# Patient Record
Sex: MALE | Race: WHITE | NOT HISPANIC OR LATINO | URBAN - METROPOLITAN AREA
[De-identification: names, ages, dates, MRNs, and addresses within clinical notes are randomized per-mention and may not be internally consistent; named-entity substitution may affect disease eponyms.]

---

## 2017-03-28 ENCOUNTER — EMERGENCY (EMERGENCY)
Facility: HOSPITAL | Age: 62
LOS: 1 days | Discharge: HOME | End: 2017-03-28
Attending: EMERGENCY MEDICINE | Admitting: EMERGENCY MEDICINE
Payer: MEDICARE

## 2017-03-28 VITALS
SYSTOLIC BLOOD PRESSURE: 126 MMHG | TEMPERATURE: 98 F | HEART RATE: 76 BPM | OXYGEN SATURATION: 99 % | RESPIRATION RATE: 18 BRPM | DIASTOLIC BLOOD PRESSURE: 78 MMHG | WEIGHT: 154.98 LBS | HEIGHT: 67 IN

## 2017-03-28 DIAGNOSIS — Z88.1 ALLERGY STATUS TO OTHER ANTIBIOTIC AGENTS STATUS: ICD-10-CM

## 2017-03-28 DIAGNOSIS — Z88.0 ALLERGY STATUS TO PENICILLIN: ICD-10-CM

## 2017-03-28 DIAGNOSIS — Z90.89 ACQUIRED ABSENCE OF OTHER ORGANS: Chronic | ICD-10-CM

## 2017-03-28 DIAGNOSIS — B20 HUMAN IMMUNODEFICIENCY VIRUS [HIV] DISEASE: ICD-10-CM

## 2017-03-28 DIAGNOSIS — S70.01XA CONTUSION OF RIGHT HIP, INITIAL ENCOUNTER: ICD-10-CM

## 2017-03-28 DIAGNOSIS — M25.551 PAIN IN RIGHT HIP: ICD-10-CM

## 2017-03-28 DIAGNOSIS — M79.604 PAIN IN RIGHT LEG: ICD-10-CM

## 2017-03-28 PROCEDURE — 73502 X-RAY EXAM HIP UNI 2-3 VIEWS: CPT | Mod: 26,RT

## 2017-03-28 PROCEDURE — 99283 EMERGENCY DEPT VISIT LOW MDM: CPT | Mod: 25

## 2017-03-28 PROCEDURE — 73502 X-RAY EXAM HIP UNI 2-3 VIEWS: CPT | Mod: 26

## 2017-03-28 RX ORDER — ACETAMINOPHEN 500 MG
650 TABLET ORAL ONCE
Qty: 0 | Refills: 0 | Status: COMPLETED | OUTPATIENT
Start: 2017-03-28 | End: 2017-03-28

## 2017-03-28 RX ORDER — IBUPROFEN 200 MG
400 TABLET ORAL ONCE
Qty: 0 | Refills: 0 | Status: COMPLETED | OUTPATIENT
Start: 2017-03-28 | End: 2017-03-28

## 2017-03-28 RX ADMIN — Medication 400 MILLIGRAM(S): at 12:06

## 2017-03-28 RX ADMIN — Medication 650 MILLIGRAM(S): at 12:06

## 2017-03-28 NOTE — ED ADULT TRIAGE NOTE - CHIEF COMPLAINT QUOTE
Patient to ED with complaint of Right leg pain s/p fall 1 day PTA.  Patient partial weight bearing.  No acute distress

## 2017-03-28 NOTE — ED PROVIDER NOTE - TIMING
improving/sudden onset sudden onset/occasional/improving sudden onset/improving improving/occasional/sudden onset

## 2017-03-28 NOTE — ED PROVIDER NOTE - PMH
GERD (gastroesophageal reflux disease) GERD (gastroesophageal reflux disease)    HIV (human immunodeficiency virus infection)

## 2017-03-28 NOTE — ED PROVIDER NOTE - MEDICAL DECISION MAKING DETAILS
Chief complaint: right lower leg pain s/p fall. Differential Dx: hip/ leg contusion v hip/ pelvic fracture. Plan: hip/ pelvic x-ray, give Tylenol and Motrin for pain, and DC with cane.

## 2017-03-28 NOTE — ED PROVIDER NOTE - NS ED MD SCRIBE ATTENDING SCRIBE SECTIONS
INTAKE ASSESSMENT/SCREENINGS/HIV/PAST MEDICAL/SURGICAL/SOCIAL HISTORY/HISTORY OF PRESENT ILLNESS/VITAL SIGNS( Pullset)/REVIEW OF SYSTEMS/PHYSICAL EXAM

## 2017-03-28 NOTE — ED PROVIDER NOTE - MUSCULOSKELETAL, MLM
Discomfort with ROM of right hip. No bruising. No midline tenderness of the sacrum or lumbar regions. Full ROM to left hip, bilateral knees and ankles. Discomfort with ROM of right hip. No bruising. No midline tenderness of the sacrum or lumbar regions. Full ROM to left hip, bilateral knees and ankles.  Abrasion right elbow

## 2017-03-28 NOTE — ED PROVIDER NOTE - OBJECTIVE STATEMENT
61y M Pt with PMHx of HIV (cd4 180, viral load undetectable) and GERD, PCN and augmentin allergies, presents to ED with right posterior upper leg pain s/p trip and fall yesterday morning around 3am. Pt reports he was walking when he tripped on a step and landed on his right leg/ right buttock and elbow. Denies head trauma. Pt was able to walk after fall but reports difficulty walking yesterday after waking up and today. Has been walking slowly with assistance. Pain has improved since yesterday. Associated mild bruising to leg and swelling to right elbow. Ibuprofen and ice improve pain. Denies h/o hip fracture. Denies swelling and sensory deficits. 61y M Pt with PMHx of HIV (cd4 180, viral load undetectable) and GERD, PCN and Augmentin allergies, presents to ED with right posterior upper leg pain s/p trip and fall yesterday morning around 3am. Pt reports he was walking when he tripped on a step and landed on his right leg/ right buttock and elbow. Denies head trauma. Pt was able to walk after fall but reports difficulty walking yesterday after waking up and today. Has been walking slowly with assistance. Pain has improved since yesterday. Associated mild bruising to leg and swelling to right elbow. Ibuprofen and ice improve pain. Denies h/o hip fracture. Denies swelling and sensory deficits. 61y M Pt with PMHx of HIV (cd4 180, viral load undetectable) and GERD, PCN and Augmentin allergies, presents to ED with right posterior upper leg pain s/p trip and fall yesterday morning around 3am. Pt reports he was walking when he tripped on a step and landed on his right leg/ right buttock and elbow. Denies head trauma. Pt was able to walk after fall but reports difficulty walking yesterday after waking up and today. Has been walking slowly with assistance. Pain has improved since yesterday. Associated mild bruising to leg and swelling to right elbow. Ibuprofen and ice improve pain. Denies h/o hip fracture. Denies swelling and sensory deficits. PMD: Mark Ospina. 61y M Pt with PMHx of HIV (cd4 180, viral load undetectable) and GERD, presents to ED with right posterior upper leg pain s/p trip and fall yesterday morning around 3am. Pt reports he was walking when he tripped on a step and landed on his right leg/ right buttock and elbow. Denies head trauma. Pt was able to walk after fall but reports difficulty walking yesterday after waking up and today. Has been walking slowly with assistance. Pain has improved since yesterday. Associated mild bruising to leg and swelling to right elbow. Ibuprofen and ice improve pain. Denies h/o hip fracture. Denies swelling and sensory deficits. PMD: Mark Ospina.

## 2017-06-03 ENCOUNTER — INPATIENT (INPATIENT)
Facility: HOSPITAL | Age: 62
LOS: 0 days | Discharge: HOME | DRG: 395 | End: 2017-06-04
Attending: INTERNAL MEDICINE | Admitting: INTERNAL MEDICINE
Payer: COMMERCIAL

## 2017-06-03 VITALS
RESPIRATION RATE: 14 BRPM | TEMPERATURE: 99 F | OXYGEN SATURATION: 100 % | HEART RATE: 66 BPM | DIASTOLIC BLOOD PRESSURE: 81 MMHG | SYSTOLIC BLOOD PRESSURE: 121 MMHG

## 2017-06-03 DIAGNOSIS — Z90.89 ACQUIRED ABSENCE OF OTHER ORGANS: Chronic | ICD-10-CM

## 2017-06-03 LAB
ALBUMIN SERPL ELPH-MCNC: 4.4 G/DL — SIGNIFICANT CHANGE UP (ref 3.4–5)
ALP SERPL-CCNC: 64 U/L — SIGNIFICANT CHANGE UP (ref 40–120)
ALT FLD-CCNC: 31 U/L — SIGNIFICANT CHANGE UP (ref 12–42)
ANION GAP SERPL CALC-SCNC: 11 MMOL/L — SIGNIFICANT CHANGE UP (ref 9–16)
APTT BLD: 30.3 SEC — SIGNIFICANT CHANGE UP (ref 27.5–36.5)
AST SERPL-CCNC: 34 U/L — SIGNIFICANT CHANGE UP (ref 15–37)
BASOPHILS NFR BLD AUTO: 0.9 % — SIGNIFICANT CHANGE UP (ref 0–2)
BILIRUB SERPL-MCNC: 0.3 MG/DL — SIGNIFICANT CHANGE UP (ref 0.2–1.2)
BUN SERPL-MCNC: 27 MG/DL — HIGH (ref 7–23)
CALCIUM SERPL-MCNC: 9.5 MG/DL — SIGNIFICANT CHANGE UP (ref 8.5–10.5)
CHLORIDE SERPL-SCNC: 100 MMOL/L — SIGNIFICANT CHANGE UP (ref 96–108)
CO2 SERPL-SCNC: 26 MMOL/L — SIGNIFICANT CHANGE UP (ref 22–31)
CREAT SERPL-MCNC: 1.38 MG/DL — HIGH (ref 0.5–1.3)
EOSINOPHIL NFR BLD AUTO: 1.4 % — SIGNIFICANT CHANGE UP (ref 0–6)
GLUCOSE SERPL-MCNC: 85 MG/DL — SIGNIFICANT CHANGE UP (ref 70–99)
HCT VFR BLD CALC: 36.5 % — LOW (ref 39–50)
HGB BLD-MCNC: 12.9 G/DL — LOW (ref 13–17)
IMM GRANULOCYTES NFR BLD AUTO: 0.2 % — SIGNIFICANT CHANGE UP (ref 0–1.5)
INR BLD: 1.03 — SIGNIFICANT CHANGE UP (ref 0.88–1.16)
LIDOCAIN IGE QN: 124 U/L — SIGNIFICANT CHANGE UP (ref 73–393)
LYMPHOCYTES # BLD AUTO: 31.4 % — SIGNIFICANT CHANGE UP (ref 13–44)
MCHC RBC-ENTMCNC: 31.9 PG — SIGNIFICANT CHANGE UP (ref 27–34)
MCHC RBC-ENTMCNC: 35.3 G/DL — SIGNIFICANT CHANGE UP (ref 32–36)
MCV RBC AUTO: 90.1 FL — SIGNIFICANT CHANGE UP (ref 80–100)
MONOCYTES NFR BLD AUTO: 7.2 % — SIGNIFICANT CHANGE UP (ref 2–14)
NEUTROPHILS NFR BLD AUTO: 58.9 % — SIGNIFICANT CHANGE UP (ref 43–77)
PLATELET # BLD AUTO: 177 K/UL — SIGNIFICANT CHANGE UP (ref 150–400)
POTASSIUM SERPL-MCNC: 3.8 MMOL/L — SIGNIFICANT CHANGE UP (ref 3.5–5.3)
POTASSIUM SERPL-SCNC: 3.8 MMOL/L — SIGNIFICANT CHANGE UP (ref 3.5–5.3)
PROT SERPL-MCNC: 7.7 G/DL — SIGNIFICANT CHANGE UP (ref 6.4–8.2)
PROTHROM AB SERPL-ACNC: 11.3 SEC — SIGNIFICANT CHANGE UP (ref 9.8–12.7)
RBC # BLD: 4.05 M/UL — LOW (ref 4.2–5.8)
RBC # FLD: 12.1 % — SIGNIFICANT CHANGE UP (ref 10.3–16.9)
SODIUM SERPL-SCNC: 137 MMOL/L — SIGNIFICANT CHANGE UP (ref 132–145)
WBC # BLD: 5.5 K/UL — SIGNIFICANT CHANGE UP (ref 3.8–10.5)
WBC # FLD AUTO: 5.5 K/UL — SIGNIFICANT CHANGE UP (ref 3.8–10.5)

## 2017-06-03 PROCEDURE — 99285 EMERGENCY DEPT VISIT HI MDM: CPT | Mod: 25

## 2017-06-03 PROCEDURE — 93010 ELECTROCARDIOGRAM REPORT: CPT

## 2017-06-03 PROCEDURE — 99222 1ST HOSP IP/OBS MODERATE 55: CPT | Mod: GC

## 2017-06-03 PROCEDURE — 70491 CT SOFT TISSUE NECK W/DYE: CPT | Mod: 26

## 2017-06-03 RX ORDER — ONDANSETRON 8 MG/1
8 TABLET, FILM COATED ORAL ONCE
Qty: 0 | Refills: 0 | Status: COMPLETED | OUTPATIENT
Start: 2017-06-03 | End: 2017-06-03

## 2017-06-03 RX ORDER — GLUCAGON INJECTION, SOLUTION 0.5 MG/.1ML
1 INJECTION, SOLUTION SUBCUTANEOUS ONCE
Qty: 0 | Refills: 0 | Status: COMPLETED | OUTPATIENT
Start: 2017-06-03 | End: 2017-06-03

## 2017-06-03 RX ORDER — NITROGLYCERIN 6.5 MG
0.4 CAPSULE, EXTENDED RELEASE ORAL ONCE
Qty: 0 | Refills: 0 | Status: COMPLETED | OUTPATIENT
Start: 2017-06-03 | End: 2017-06-03

## 2017-06-03 RX ORDER — SODIUM CHLORIDE 9 MG/ML
1000 INJECTION INTRAMUSCULAR; INTRAVENOUS; SUBCUTANEOUS ONCE
Qty: 0 | Refills: 0 | Status: COMPLETED | OUTPATIENT
Start: 2017-06-03 | End: 2017-06-03

## 2017-06-03 RX ADMIN — ONDANSETRON 8 MILLIGRAM(S): 8 TABLET, FILM COATED ORAL at 21:42

## 2017-06-03 RX ADMIN — SODIUM CHLORIDE 1000 MILLILITER(S): 9 INJECTION INTRAMUSCULAR; INTRAVENOUS; SUBCUTANEOUS at 19:29

## 2017-06-03 RX ADMIN — Medication 0.4 MILLIGRAM(S): at 19:29

## 2017-06-03 RX ADMIN — GLUCAGON INJECTION, SOLUTION 1 MILLIGRAM(S): 0.5 INJECTION, SOLUTION SUBCUTANEOUS at 21:42

## 2017-06-03 NOTE — ED PROVIDER NOTE - NS ED MD SCRIBE ATTENDING SCRIBE SECTIONS
HIV/DISPOSITION/PROGRESS NOTE/INTAKE ASSESSMENT/SCREENINGS/VITAL SIGNS( Pullset)/CONSULTATIONS/SHIFT CHANGE/RESULTS/HISTORY OF PRESENT ILLNESS/REVIEW OF SYSTEMS/PAST MEDICAL/SURGICAL/SOCIAL HISTORY/PHYSICAL EXAM

## 2017-06-03 NOTE — ED PROVIDER NOTE - PROGRESS NOTE DETAILS
Attempted Glucagon for esophageal food impaction.  No success - unable to tolerate solids.  Continues to regurgitate foods and saliva.  Will consult GI.  Case discussed with Dr. Knox from GI.  Agrees that the patient requires an endoscopy for impaction removal.  Case discussed with transfer center and pt accepted by Dr. Cowan for admission to [facility] under Dr. Eid.

## 2017-06-03 NOTE — ED PROVIDER NOTE - MEDICAL DECISION MAKING DETAILS
60 yo M with sore throat after swallowing broccoli. Will conduct IV fluid bolus, Nitrostat and labs and reassess. 62 yo M with sore throat after swallowing broccoli. Will conduct IV fluid bolus, Nitrostat and labs and reassess.  No success with NTG.  CT performed to confirm FB.  Attempted glucagon without success.  Pt requires EGD for FB removal.

## 2017-06-03 NOTE — ED PROVIDER NOTE - CONSTITUTIONAL, MLM
normal... Well appearing, well nourished, awake, alert, oriented to person, place, time/situation and in no apparent distress. Pt was able to swallow small sip of water.

## 2017-06-03 NOTE — ED PROVIDER NOTE - OBJECTIVE STATEMENT
60 yo M with Hx of HIV, GERD and endoscopy with recent T-cell count being 118, presents c/o throat pain after swallowing piece of broccoli. Pt states was eating quickly and swallowed broccoli and soon then after felt throat pain and soreness. Pt notes was able to ingest yogurt and soft foods, but not more solid foods such as fish. Denies fever, chills or any other complaints. 62 yo M with Hx of HIV/AIDS and GERD presents c/o throat pain after swallowing piece of broccoli. Pt states was eating quickly around 430pm today and swallowed a piece of raw broccoli without chewing.  Immediately after felt throat pain and soreness with swallowing (indicates area of pain around sternal notch). Pt notes was able to ingest liquids, but not more solid foods such as fish.  Now regurgitating anything he tries to swallow.  Denies fever, chills or any other complaints.

## 2017-06-03 NOTE — ED ADULT NURSE NOTE - OBJECTIVE STATEMENT
s/p eating broccoli at 4p and believes it is stuck in esophagus, initially was able to swallow yogurt and liquids but now can not swallow without spitting it up

## 2017-06-04 VITALS
HEART RATE: 67 BPM | DIASTOLIC BLOOD PRESSURE: 81 MMHG | RESPIRATION RATE: 18 BRPM | SYSTOLIC BLOOD PRESSURE: 129 MMHG | TEMPERATURE: 98 F | OXYGEN SATURATION: 97 %

## 2017-06-04 DIAGNOSIS — K21.9 GASTRO-ESOPHAGEAL REFLUX DISEASE WITHOUT ESOPHAGITIS: ICD-10-CM

## 2017-06-04 DIAGNOSIS — Z21 ASYMPTOMATIC HUMAN IMMUNODEFICIENCY VIRUS [HIV] INFECTION STATUS: ICD-10-CM

## 2017-06-04 DIAGNOSIS — Z29.9 ENCOUNTER FOR PROPHYLACTIC MEASURES, UNSPECIFIED: ICD-10-CM

## 2017-06-04 DIAGNOSIS — R63.8 OTHER SYMPTOMS AND SIGNS CONCERNING FOOD AND FLUID INTAKE: ICD-10-CM

## 2017-06-04 DIAGNOSIS — F32.9 MAJOR DEPRESSIVE DISORDER, SINGLE EPISODE, UNSPECIFIED: ICD-10-CM

## 2017-06-04 DIAGNOSIS — Z98.49 CATARACT EXTRACTION STATUS, UNSPECIFIED EYE: Chronic | ICD-10-CM

## 2017-06-04 DIAGNOSIS — T18.108A UNSPECIFIED FOREIGN BODY IN ESOPHAGUS CAUSING OTHER INJURY, INITIAL ENCOUNTER: ICD-10-CM

## 2017-06-04 DIAGNOSIS — F41.9 ANXIETY DISORDER, UNSPECIFIED: ICD-10-CM

## 2017-06-04 PROCEDURE — 99239 HOSP IP/OBS DSCHRG MGMT >30: CPT

## 2017-06-04 PROCEDURE — 70360 X-RAY EXAM OF NECK: CPT | Mod: 26

## 2017-06-04 RX ORDER — SODIUM CHLORIDE 9 MG/ML
1000 INJECTION INTRAMUSCULAR; INTRAVENOUS; SUBCUTANEOUS
Qty: 0 | Refills: 0 | Status: DISCONTINUED | OUTPATIENT
Start: 2017-06-04 | End: 2017-06-04

## 2017-06-04 RX ORDER — SODIUM CHLORIDE 9 MG/ML
1000 INJECTION, SOLUTION INTRAVENOUS
Qty: 0 | Refills: 0 | Status: DISCONTINUED | OUTPATIENT
Start: 2017-06-04 | End: 2017-06-04

## 2017-06-04 RX ADMIN — SODIUM CHLORIDE 80 MILLILITER(S): 9 INJECTION, SOLUTION INTRAVENOUS at 03:15

## 2017-06-04 NOTE — DISCHARGE NOTE ADULT - CARE PLAN
Principal Discharge DX:	Foreign body in esophagus, initial encounter  Goal:	Follow-up with a gastroenterologist  Secondary Diagnosis:	Anxiety  Instructions for follow-up, activity and diet:	Continue your home medication  Secondary Diagnosis:	Depression  Instructions for follow-up, activity and diet:	Continue your home medication  Secondary Diagnosis:	GERD (gastroesophageal reflux disease)  Instructions for follow-up, activity and diet:	Continue your home medication  Secondary Diagnosis:	HIV (human immunodeficiency virus infection)  Instructions for follow-up, activity and diet:	Continue your home medication Principal Discharge DX:	Foreign body in esophagus, initial encounter  Goal:	Follow-up with your gastroenterologist  Instructions for follow-up, activity and diet:	You had an upper endoscopy and showed that you had a piece of broccoli stuck in your esophagus. Please ensure adequate mastication prior to swallowing. For 1 day after the endoscopy, please adhere to a clear liquid diet. Subsequently, you can start on a soft diet until the pain resolves. If you start having worsening pain, fever, chills, or bloody vomit, please return to the ED immediately for evaluation.  Secondary Diagnosis:	Anxiety  Instructions for follow-up, activity and diet:	Continue your home medication  Secondary Diagnosis:	Depression  Instructions for follow-up, activity and diet:	Continue your home medication  Secondary Diagnosis:	GERD (gastroesophageal reflux disease)  Instructions for follow-up, activity and diet:	Continue your home medication  Secondary Diagnosis:	HIV (human immunodeficiency virus infection)  Instructions for follow-up, activity and diet:	Continue your home medication Principal Discharge DX:	Foreign body in esophagus, initial encounter  Goal:	Follow-up with your gastroenterologist  Instructions for follow-up, activity and diet:	You had an upper endoscopy and showed that you had a piece of broccoli stuck in your esophagus. Please ensure adequate mastication prior to swallowing. For 1 day after the endoscopy, please adhere to a liquid diet. Subsequently, you can start on a soft diet until the pain resolves. If you start having worsening pain, fever, chills, or bloody vomit, please return to the ED immediately for evaluation. PLease follow up with your primary doctor.  Secondary Diagnosis:	Anxiety  Instructions for follow-up, activity and diet:	Continue your home medication  Secondary Diagnosis:	Depression  Instructions for follow-up, activity and diet:	Continue your home medication  Secondary Diagnosis:	GERD (gastroesophageal reflux disease)  Instructions for follow-up, activity and diet:	Continue your home medication  Secondary Diagnosis:	HIV (human immunodeficiency virus infection)  Instructions for follow-up, activity and diet:	Continue your home medication

## 2017-06-04 NOTE — H&P ADULT - HISTORY OF PRESENT ILLNESS
62 YO M h/o HIV/AIDS (CD4 118 VL UD), depression, anxiety, and GERD p/w throat pain after swallowing a piece of raw broccoli. Pt states that at 430 PM he was eating a chopped salad and was eating quickly and swallowed a piece of raw broccoli without chewing. Afterwards, he felt throat pain and soreness, however, he was able to ingest liquids and yogurt. At 630 PM, he was having dinner and noted worsening throat pain and regurgitated everything he subsequently ate. Pt denies prior history of similar symptoms. Last endoscopy was many years ago when he was diagnosed with GERD. Pt states that he has been coughing to clear his throat but denies, fever, chills, hematemesis, hemoptysis, CP, SOB, abdominal pain, nausea, vomiting, melena, hematochezia, dysuria, urinary frequency, hesitancy, joint pain, or rash.     In the ED, VS T 98.7 HR 66-73 -127/77-82 RR 16-18 SPO2 % on RA. Pt received Zofran 8 mg IVP, Glucagon 1 mg IVP, Nitro 0,4 mg SL, and NS 1 L bolus.

## 2017-06-04 NOTE — PATIENT PROFILE ADULT. - HEALTH/HEALTHCARE ANXIETIES, PROFILE
Concerned about having this procedure tonight and want to be able to eat and drink normally tomorrow

## 2017-06-04 NOTE — H&P ADULT - PROBLEM SELECTOR PLAN 1
- CT scan consistent with bubbly fluid in the esophagus at C7/T1, consistent with a likely food impaction given clinical history; no free air visualized to suggest esophageal perforation  - s/p glucagon and nitro SL without improvement, GI aware and plan for endoscopy tonight  - F/u GI post-procedure

## 2017-06-04 NOTE — H&P ADULT - ATTENDING COMMENTS
pt seen and examined s/p food disimpaction by GI; reviewed vs, labs, ekg, ct report  pt in NAD, awake, alert; agree w/ PE findings  a/p:   1. Food impaction s/p removal , appreciated GI recs; follow up xray results prior to discharge; NPO for now, start clears in AM pending xray read. Follow up w/ GI as outpt.   2. AYAKA: slight BUN/ Cr elevation, baseline unknown; pt reports recent labs done on as outpt were WNL; will monitor renal function and lytes; on IVFs s/p procedure   3. resume home medication after discharge pending xray results. pt seen and examined s/p food disimpaction by GI; reviewed vs, labs, ekg, ct report  pt in NAD, awake, alert; agree w/ PE findings  a/p:   1. Food impaction s/p removal , appreciate GI recs; follow up xray results prior to discharge; NPO for now, start clears in AM pending xray read. Follow up w/ GI as outpt.   2. AYAKA: slight BUN/ Cr elevation, baseline unknown; pt reports recent labs done on as outpt were WNL; will monitor renal function and lytes; on IVFs s/p procedure   3. resume home medication after discharge pending xray results.

## 2017-06-04 NOTE — DISCHARGE NOTE ADULT - HOSPITAL COURSE
62 YO M h/o HIV/AIDS (CD4 118 VL UD), depression, anxiety, and GERD p/w throat pain after swallowing a piece of raw broccoli admitted for a food impaction. Pt went to the OR for an upper endoscopy which showed 62 YO M h/o HIV/AIDS (CD4 118 VL UD), depression, anxiety, and GERD p/w throat pain after swallowing a piece of raw broccoli admitted for a food impaction. Pt went to the OR for an upper endoscopy which showed a piece of broccoli lodged in the esophagus, no evidence of esophagitis or Schatzki's ring. Pt had an XR of his neck to r/o microperforation. 60 YO M h/o HIV/AIDS (CD4 118 VL UD), depression, anxiety, and GERD p/w throat pain after swallowing a piece of raw broccoli admitted for a food impaction. Pt went to the OR for an upper endoscopy which showed a piece of broccoli lodged in the esophagus, no evidence of esophagitis or Schatzki's ring. Pt had an XR of his neck to r/o microperforation which was (-). He is OK to discharge with clear liquid diet for 24 hrs and then soft diet.

## 2017-06-04 NOTE — DISCHARGE NOTE ADULT - PATIENT PORTAL LINK FT
“You can access the [facility] Patient Portal, offered by [facility], by registering with the following website: [email or web address]”

## 2017-06-04 NOTE — H&P ADULT - NSHPLABSRESULTS_GEN_ALL_CORE
.  LABS:                         12.9   5.5   )-----------( 177      ( 03 Jun 2017 19:36 )             36.5     06-03    137  |  100  |  27<H>  ----------------------------<  85  3.8   |  26  |  1.38<H>    Ca    9.5      03 Jun 2017 19:36    TPro  7.7  /  Alb  4.4  /  TBili  0.3  /  DBili  x   /  AST  34  /  ALT  31  /  AlkPhos  64  06-03    PT/INR - ( 03 Jun 2017 19:36 )   PT: 11.3 sec;   INR: 1.03          PTT - ( 03 Jun 2017 19:36 )  PTT:30.3 sec    RADIOLOGY, EKG & ADDITIONAL TESTS:   CT NECK SOFT TISSUE: Dilatation of the visualized esophagus with pocket of bubbly fluid seen at C7/T1 level. No evidence of a mucosal mass lesion in the neck or significant cervical lymphadenopathy. No radiopaque foreign body identified. Consider follow-up and further assessment with esophagram.

## 2017-06-04 NOTE — CONSULT NOTE ADULT - ASSESSMENT
60 YO M h/o HIV/AIDS (CD4 118 VL UD), depression, anxiety, and GERD p/w odynophagia/dysphagia    #food impaction - s/p EGD revealing broccoli stem at the UES s/p endoscopic maneuvering to push into stomach there were no rings dital to the lesion, it seems that impaciton was due to poor ability to masticate solid food. Due to the manipulation and location of the food, there was irritaiton of the hypopharyngeal mucosa, however all bleeding spontaneously stopped and no visible tears or defects.  -liquid diet for 24 hours  -afterwards advanced to soft diet, and stay that way until dental work sorted out  -soft tissue neck XR to evaluate possible free air as food was impacted for 6 hours and area of maneuvering was very tight

## 2017-06-04 NOTE — CONSULT NOTE ADULT - SUBJECTIVE AND OBJECTIVE BOX
60 YO M h/o HIV/AIDS (CD4 118 VL UD), depression, anxiety, and GERD p/w odynophagia/dysphagia     pt reports ingesting unchewed broccoli when experienced sudden neck pain. he was intermittently able to hold down food and liquids, alternating with complete regurgitation. he has never had any similar symptoms in the past. no gradual onset.   he had EGD for GERD in the past, otherwise no GI hx. No abd pain, no fevers or chills, no n/v, no blood noted.   he reports poor dentition and plan to have dental work to explain poor mastication of food.     REVIEW OF SYSTEMS:  Constitutional: No fever, weight loss or fatigue  ENMT:  No difficulty hearing, tinnitus, vertigo; No sinus or throat pain  Respiratory: No cough, wheezing, chills or hemoptysis  Cardiovascular: No chest pain, palpitations, dizziness or leg swelling  Gastrointestinal: No abdominal or epigastric pain. No nausea, vomiting or hematemesis; No diarrhea or constipation. No melena or hematochezia.  Skin: No itching, burning, rashes or lesions   Musculoskeletal: No joint pain or swelling; No muscle, back or extremity pain    PAST MEDICAL & SURGICAL HISTORY:  Depression  Anxiety  HIV (human immunodeficiency virus infection)  GERD (gastroesophageal reflux disease)  S/P cataract extraction  S/P tonsillectomy      FAMILY HISTORY:  Paternal family history of emphysema (Father)      SOCIAL HISTORY:  Smoking Status: [ ] Current, [ ] Former, [ ] Never  Pack Years:    MEDICATIONS:  MEDICATIONS  (STANDING):  sodium chloride 0.9%. 1000milliLiter(s) IV Continuous <Continuous>    MEDICATIONS  (PRN):      Allergies    Augmentin (Rash)  penicillin (Rash)    Intolerances        Vital Signs Last 24 Hrs  T(C): 36.6, Max: 37.1 (06-03 @ 19:08)  T(F): 97.8, Max: 98.7 (06-03 @ 19:08)  HR: 69 (66 - 73)  BP: 136/83 (121/81 - 136/83)  BP(mean): --  RR: 18 (14 - 18)  SpO2: 98% (98% - 100%)        PHYSICAL EXAM:    General: Well developed; well nourished; anxious  HEENT: MMM, conjunctiva and sclera clear, tender submandibular area  Gastrointestinal: Soft, non-tender non-distended; Normal bowel sounds; No rebound or guarding  Extremities: Normal range of motion, No clubbing, cyanosis or edema  Neurological: Alert and oriented x3  Skin: Warm and dry. No obvious rash      LABS:                        12.9   5.5   )-----------( 177      ( 03 Jun 2017 19:36 )             36.5   Impression: Dilatation of the visualized esophagus with pocket of bubbly   fluid seen at C7/T1 level. No evidence of a mucosal mass lesion in the   neck or significant cervical lymphadenopathy. No radiopaque foreign body   identified. Consider follow-up and further assessment with esophagram.  06-03    137  |  100  |  27<H>  ----------------------------<  85  3.8   |  26  |  1.38<H>    Ca    9.5      03 Jun 2017 19:36    TPro  7.7  /  Alb  4.4  /  TBili  0.3  /  DBili  x   /  AST  34  /  ALT  31  /  AlkPhos  64  06-03        RADIOLOGY & ADDITIONAL STUDIES:     ression: Dilatation of the visualized esophagus with pocket of bubbly   fluid seen at C7/T1 level. No evidence of a mucosal mass lesion in the   neck or significant cervical lymphadenopathy. No radiopaque foreign body   identified. Consider follow-up and further assessment with esophagram.

## 2017-06-04 NOTE — H&P ADULT - NSHPREVIEWOFSYSTEMS_GEN_ALL_CORE
REVIEW OF SYSTEMS:    CONSTITUTIONAL: No weakness, fevers or chills  EYES/ENT: No visual changes;  No vertigo   NECK: No pain or stiffness  RESPIRATORY: No wheezing, hemoptysis; No shortness of breath  CARDIOVASCULAR: No chest pain or palpitations  GASTROINTESTINAL: No abdominal or epigastric pain. No nausea, vomiting, or hematemesis; No diarrhea or constipation. No melena or hematochezia.  GENITOURINARY: No dysuria, frequency or hematuria  NEUROLOGICAL: No numbness or weakness  SKIN: No itching, burning, rashes, or lesions   All other review of systems is negative unless indicated above.

## 2017-06-04 NOTE — DISCHARGE NOTE ADULT - SECONDARY DIAGNOSIS.
Anxiety Depression GERD (gastroesophageal reflux disease) HIV (human immunodeficiency virus infection)

## 2017-06-04 NOTE — H&P ADULT - ASSESSMENT
62 YO M h/o HIV/AIDS (CD4 118 VL UD), depression, anxiety, and GERD p/w throat pain after swallowing a piece of raw broccoli.

## 2017-06-04 NOTE — DISCHARGE NOTE ADULT - MEDICATION SUMMARY - MEDICATIONS TO TAKE
I will START or STAY ON the medications listed below when I get home from the hospital:    KlonoPIN  --  by mouth   -- Indication: For Anxiety    sertraline  --  by mouth   -- Indication: For Depression    Selzentry  --  by mouth   -- Indication: For HIV (human immunodeficiency virus infection)    Stribild  --  by mouth   -- Indication: For HIV (human immunodeficiency virus infection)    Zithromax  --  by mouth   -- Indication: For HIV (human immunodeficiency virus infection)    RABEprazole  --  by mouth   -- Indication: For GERD (gastroesophageal reflux disease)

## 2017-06-04 NOTE — H&P ADULT - NSHPPHYSICALEXAM_GEN_ALL_CORE
.  VITAL SIGNS:  T(C): 36.6, Max: 37.1 (06-03 @ 19:08)  T(F): 97.8, Max: 98.7 (06-03 @ 19:08)  HR: 69 (66 - 73)  BP: 136/83 (121/81 - 136/83)  BP(mean): --  RR: 18 (14 - 18)  SpO2: 98% (98% - 100%)  Wt(kg): --    PHYSICAL EXAM:    Constitutional: WDWN resting comfortably in bed; NAD  Head: NC/AT  Eyes: PERRL, EOMI, anicteric sclera  ENT: no nasal discharge; uvula midline, no oropharyngeal erythema or exudates; MMM; no stridor  Neck: supple; no JVD or thyromegaly  Respiratory: CTA B/L; no W/R/R, no retractions  Cardiac: +S1/S2; RRR; no M/R/G; PMI non-displaced  Gastrointestinal: soft, NT/ND; no rebound or guarding; +BSx4  Extremities: WWP, no clubbing or cyanosis; no peripheral edema  Musculoskeletal: no joint swelling, tenderness or erythema; gait balanced  Vascular: 2+ radial and DP pulses B/L  Dermatologic: skin warm, dry and intact; no rashes, wounds, or scars  Lymphatic: no submandibular or cervical LAD  Neurologic: AAOx3; CNII-XII grossly intact; no focal deficits  Psychiatric: affect and characteristics of appearance, verbalizations, behaviors are appropriate

## 2017-06-04 NOTE — DISCHARGE NOTE ADULT - PLAN OF CARE
Follow-up with a gastroenterologist Continue your home medication You had an upper endoscopy and showed that you had a piece of broccoli stuck in your esophagus. Please ensure adequate mastication prior to swallowing. For 1 day after the endoscopy, please adhere to a clear liquid diet. Subsequently, you can start on a soft diet until the pain resolves. If you start having worsening pain, fever, chills, or bloody vomit, please return to the ED immediately for evaluation. Follow-up with your gastroenterologist You had an upper endoscopy and showed that you had a piece of broccoli stuck in your esophagus. Please ensure adequate mastication prior to swallowing. For 1 day after the endoscopy, please adhere to a liquid diet. Subsequently, you can start on a soft diet until the pain resolves. If you start having worsening pain, fever, chills, or bloody vomit, please return to the ED immediately for evaluation. PLease follow up with your primary doctor.

## 2017-06-06 DIAGNOSIS — J98.8 OTHER SPECIFIED RESPIRATORY DISORDERS: ICD-10-CM

## 2017-06-06 DIAGNOSIS — F41.9 ANXIETY DISORDER, UNSPECIFIED: ICD-10-CM

## 2017-06-06 DIAGNOSIS — K21.9 GASTRO-ESOPHAGEAL REFLUX DISEASE WITHOUT ESOPHAGITIS: ICD-10-CM

## 2017-06-06 DIAGNOSIS — T18.128A FOOD IN ESOPHAGUS CAUSING OTHER INJURY, INITIAL ENCOUNTER: ICD-10-CM

## 2017-06-06 DIAGNOSIS — Z21 ASYMPTOMATIC HUMAN IMMUNODEFICIENCY VIRUS [HIV] INFECTION STATUS: ICD-10-CM

## 2017-06-06 DIAGNOSIS — F32.9 MAJOR DEPRESSIVE DISORDER, SINGLE EPISODE, UNSPECIFIED: ICD-10-CM

## 2017-06-07 PROCEDURE — 96375 TX/PRO/DX INJ NEW DRUG ADDON: CPT | Mod: XU

## 2017-06-07 PROCEDURE — 36415 COLL VENOUS BLD VENIPUNCTURE: CPT

## 2017-06-07 PROCEDURE — 93005 ELECTROCARDIOGRAM TRACING: CPT

## 2017-06-07 PROCEDURE — 70491 CT SOFT TISSUE NECK W/DYE: CPT

## 2017-06-07 PROCEDURE — 80053 COMPREHEN METABOLIC PANEL: CPT

## 2017-06-07 PROCEDURE — 85610 PROTHROMBIN TIME: CPT

## 2017-06-07 PROCEDURE — 99285 EMERGENCY DEPT VISIT HI MDM: CPT | Mod: 25

## 2017-06-07 PROCEDURE — 96374 THER/PROPH/DIAG INJ IV PUSH: CPT | Mod: XU

## 2017-06-07 PROCEDURE — 85730 THROMBOPLASTIN TIME PARTIAL: CPT

## 2017-06-07 PROCEDURE — 70360 X-RAY EXAM OF NECK: CPT

## 2017-06-07 PROCEDURE — 83690 ASSAY OF LIPASE: CPT

## 2017-06-07 PROCEDURE — 85025 COMPLETE CBC W/AUTO DIFF WBC: CPT

## 2017-10-08 ENCOUNTER — EMERGENCY (EMERGENCY)
Facility: HOSPITAL | Age: 62
LOS: 1 days | Discharge: HOME | End: 2017-10-08
Admitting: EMERGENCY MEDICINE
Payer: MEDICARE

## 2017-10-08 VITALS
RESPIRATION RATE: 18 BRPM | OXYGEN SATURATION: 97 % | DIASTOLIC BLOOD PRESSURE: 80 MMHG | TEMPERATURE: 98 F | SYSTOLIC BLOOD PRESSURE: 115 MMHG | HEART RATE: 80 BPM

## 2017-10-08 DIAGNOSIS — Z79.899 OTHER LONG TERM (CURRENT) DRUG THERAPY: ICD-10-CM

## 2017-10-08 DIAGNOSIS — Z88.1 ALLERGY STATUS TO OTHER ANTIBIOTIC AGENTS STATUS: ICD-10-CM

## 2017-10-08 DIAGNOSIS — Z88.0 ALLERGY STATUS TO PENICILLIN: ICD-10-CM

## 2017-10-08 DIAGNOSIS — J40 BRONCHITIS, NOT SPECIFIED AS ACUTE OR CHRONIC: ICD-10-CM

## 2017-10-08 DIAGNOSIS — M62.830 MUSCLE SPASM OF BACK: ICD-10-CM

## 2017-10-08 DIAGNOSIS — M54.6 PAIN IN THORACIC SPINE: ICD-10-CM

## 2017-10-08 DIAGNOSIS — Z90.09 ACQUIRED ABSENCE OF OTHER PART OF HEAD AND NECK: ICD-10-CM

## 2017-10-08 DIAGNOSIS — Z87.891 PERSONAL HISTORY OF NICOTINE DEPENDENCE: ICD-10-CM

## 2017-10-08 DIAGNOSIS — Z79.2 LONG TERM (CURRENT) USE OF ANTIBIOTICS: ICD-10-CM

## 2017-10-08 DIAGNOSIS — F41.9 ANXIETY DISORDER, UNSPECIFIED: ICD-10-CM

## 2017-10-08 DIAGNOSIS — Z98.49 CATARACT EXTRACTION STATUS, UNSPECIFIED EYE: Chronic | ICD-10-CM

## 2017-10-08 DIAGNOSIS — M25.511 PAIN IN RIGHT SHOULDER: ICD-10-CM

## 2017-10-08 DIAGNOSIS — Z90.89 ACQUIRED ABSENCE OF OTHER ORGANS: Chronic | ICD-10-CM

## 2017-10-08 LAB
ALBUMIN SERPL ELPH-MCNC: 4.2 G/DL — SIGNIFICANT CHANGE UP (ref 3.4–5)
ALP SERPL-CCNC: 70 U/L — SIGNIFICANT CHANGE UP (ref 40–120)
ALT FLD-CCNC: 33 U/L — SIGNIFICANT CHANGE UP (ref 12–42)
ANION GAP SERPL CALC-SCNC: 10 MMOL/L — SIGNIFICANT CHANGE UP (ref 9–16)
AST SERPL-CCNC: 49 U/L — HIGH (ref 15–37)
BILIRUB SERPL-MCNC: 0.5 MG/DL — SIGNIFICANT CHANGE UP (ref 0.2–1.2)
BUN SERPL-MCNC: 23 MG/DL — SIGNIFICANT CHANGE UP (ref 7–23)
CALCIUM SERPL-MCNC: 9.4 MG/DL — SIGNIFICANT CHANGE UP (ref 8.5–10.5)
CHLORIDE SERPL-SCNC: 103 MMOL/L — SIGNIFICANT CHANGE UP (ref 96–108)
CK SERPL-CCNC: 207 U/L — SIGNIFICANT CHANGE UP (ref 39–308)
CO2 SERPL-SCNC: 25 MMOL/L — SIGNIFICANT CHANGE UP (ref 22–31)
CREAT SERPL-MCNC: 1.18 MG/DL — SIGNIFICANT CHANGE UP (ref 0.5–1.3)
D DIMER BLD IA.RAPID-MCNC: <150 NG/ML DDU — SIGNIFICANT CHANGE UP
GLUCOSE SERPL-MCNC: 106 MG/DL — HIGH (ref 70–99)
HCT VFR BLD CALC: 39.9 % — SIGNIFICANT CHANGE UP (ref 39–50)
HGB BLD-MCNC: 14.6 G/DL — SIGNIFICANT CHANGE UP (ref 13–17)
MCHC RBC-ENTMCNC: 32.7 PG — SIGNIFICANT CHANGE UP (ref 27–34)
MCHC RBC-ENTMCNC: 36.6 G/DL — HIGH (ref 32–36)
MCV RBC AUTO: 89.3 FL — SIGNIFICANT CHANGE UP (ref 80–100)
PLATELET # BLD AUTO: 204 K/UL — SIGNIFICANT CHANGE UP (ref 150–400)
POTASSIUM SERPL-MCNC: 4.6 MMOL/L — SIGNIFICANT CHANGE UP (ref 3.5–5.3)
POTASSIUM SERPL-SCNC: 4.6 MMOL/L — SIGNIFICANT CHANGE UP (ref 3.5–5.3)
PROT SERPL-MCNC: 8.2 G/DL — SIGNIFICANT CHANGE UP (ref 6.4–8.2)
RBC # BLD: 4.47 M/UL — SIGNIFICANT CHANGE UP (ref 4.2–5.8)
RBC # FLD: 12.3 % — SIGNIFICANT CHANGE UP (ref 10.3–16.9)
SODIUM SERPL-SCNC: 138 MMOL/L — SIGNIFICANT CHANGE UP (ref 132–145)
TROPONIN I SERPL-MCNC: <0.017 NG/ML — LOW (ref 0.02–0.06)
WBC # BLD: 9.2 K/UL — SIGNIFICANT CHANGE UP (ref 3.8–10.5)
WBC # FLD AUTO: 9.2 K/UL — SIGNIFICANT CHANGE UP (ref 3.8–10.5)

## 2017-10-08 PROCEDURE — 73030 X-RAY EXAM OF SHOULDER: CPT | Mod: 26,RT

## 2017-10-08 PROCEDURE — 99285 EMERGENCY DEPT VISIT HI MDM: CPT | Mod: 25

## 2017-10-08 PROCEDURE — 71020: CPT | Mod: 26

## 2017-10-08 PROCEDURE — 93010 ELECTROCARDIOGRAM REPORT: CPT

## 2017-10-08 PROCEDURE — 93010 ELECTROCARDIOGRAM REPORT: CPT | Mod: 77

## 2017-10-08 RX ORDER — LIDOCAINE 4 G/100G
1 CREAM TOPICAL
Qty: 10 | Refills: 0
Start: 2017-10-08

## 2017-10-08 RX ORDER — IBUPROFEN 200 MG
1 TABLET ORAL
Qty: 20 | Refills: 0
Start: 2017-10-08 | End: 2017-11-07

## 2017-10-08 RX ORDER — KETOROLAC TROMETHAMINE 30 MG/ML
30 SYRINGE (ML) INJECTION ONCE
Qty: 0 | Refills: 0 | Status: DISCONTINUED | OUTPATIENT
Start: 2017-10-08 | End: 2017-10-08

## 2017-10-08 RX ORDER — DIAZEPAM 5 MG
1 TABLET ORAL
Qty: 10 | Refills: 0
Start: 2017-10-08

## 2017-10-08 RX ORDER — IPRATROPIUM/ALBUTEROL SULFATE 18-103MCG
3 AEROSOL WITH ADAPTER (GRAM) INHALATION ONCE
Qty: 0 | Refills: 0 | Status: COMPLETED | OUTPATIENT
Start: 2017-10-08 | End: 2017-10-08

## 2017-10-08 RX ADMIN — Medication 3 MILLILITER(S): at 07:56

## 2017-10-08 RX ADMIN — Medication 30 MILLIGRAM(S): at 07:07

## 2017-10-08 RX ADMIN — Medication 30 MILLIGRAM(S): at 07:56

## 2017-10-08 RX ADMIN — Medication 60 MILLIGRAM(S): at 07:56

## 2017-10-08 NOTE — ED PROVIDER NOTE - DIAGNOSTIC INTERPRETATION
Xray (wet reads) interpreted by JACQUELINE RAJPUT   CXR - Cardiac silhouette, mediastinal and hilar contours wnl, no acute consolidation, infiltrate, effusion, or PTX. No bony abnormalities noted   Xray shoulder - +soft tissue swelling. no acute fx or dislocation, joint space intact, no effusion noted Xray (wet reads) interpreted by JACQUELINE RAJPUT   CXR - Cardiac silhouette, mediastinal and hilar contours wnl, no acute consolidation, infiltrate, effusion, or PTX. +atelectasis, No bony abnormalities noted   Xray shoulder - no soft tissue swelling. no acute fx or dislocation, joint space intact, no effusion noted

## 2017-10-08 NOTE — ED PROVIDER NOTE - CARE PLAN
Principal Discharge DX:	Muscle spasm  Secondary Diagnosis:	Back pain  Secondary Diagnosis:	Bronchitis

## 2017-10-08 NOTE — ED PROVIDER NOTE - PHYSICAL EXAMINATION
Gen - WDWN M, in pain, no respiratory distress, non-toxic appearing, speaking in full sentences   Skin - warm, dry, intact  HEENT - AT/NC, PERRL, EOMI, no conjunctival injection, o/p clear with no erythema, edema, or exudate, uvula midline, airway patent, neck supple, no JVD or carotid bruits b/l, no palpable nodes, FROM  CV - S1S2, R/R/R  Resp - respiration non-labored, mild expiratory wheezing to the bibasilar region, R>L,, no focal rhonchi or rales   GI - NABS, soft, ND, NT, no rebound or guarding, no CVAT b/l   MS - w/w/p, R upper back/subscapular region TTP with no focal edema/ecchymosis/crepitus/fluctuance, NV intact, FROM, tenderness with change in position    Neuro - AxOx3, no focal neuro deficits, CN II-XII grossly intact, cerebellar function intact, ambulatory without gait disturbance

## 2017-10-08 NOTE — ED PROVIDER NOTE - OBJECTIVE STATEMENT
62 yo M with PMHx of HIV/AIDS, last CD4 180s, VL undetectable, on HAART, bactrim for PCP ppx, anxiety, depression, GERD, presenting c/o R upper back and shoulder pain x 2d.  Pt reports sudden onset of pain in the R upper back/shoulder region with sharp sensation radiating to the R arm.  Pain is worse with change in position and with no improvement after taking tylenol and lidocaine patch.  Noted pain radiating to the front of the chest tonight and mild tingling sensation in the R arm.  Denies fever, chills, palpitations, diaphoresis, PRAKASH, SOB, orthopnea, cough, hemoptysis, wheezing, peripheral edema, focal weakness, numbness, paresthesia, HA, dizziness, neck pain, N/V/D/C, abdominal pain, change in urinary/bowel function, trauma, fall, rash, and malaise. Admits to working out and lifting some light weights a few days ago, otherwise no associated injury or recent travel noted

## 2021-06-18 ENCOUNTER — EMERGENCY (EMERGENCY)
Facility: HOSPITAL | Age: 66
LOS: 1 days | Discharge: HOME | End: 2021-06-18
Attending: EMERGENCY MEDICINE | Admitting: EMERGENCY MEDICINE
Payer: MEDICARE

## 2021-06-18 VITALS
RESPIRATION RATE: 16 BRPM | TEMPERATURE: 98 F | OXYGEN SATURATION: 97 % | SYSTOLIC BLOOD PRESSURE: 115 MMHG | HEIGHT: 67.5 IN | WEIGHT: 147.93 LBS | HEART RATE: 78 BPM | DIASTOLIC BLOOD PRESSURE: 53 MMHG

## 2021-06-18 DIAGNOSIS — T50.992A POISONING BY OTHER DRUGS, MEDICAMENTS AND BIOLOGICAL SUBSTANCES, INTENTIONAL SELF-HARM, INITIAL ENCOUNTER: ICD-10-CM

## 2021-06-18 DIAGNOSIS — Z90.89 ACQUIRED ABSENCE OF OTHER ORGANS: Chronic | ICD-10-CM

## 2021-06-18 DIAGNOSIS — T46.6X1A POISONING BY ANTIHYPERLIPIDEMIC AND ANTIARTERIOSCLEROTIC DRUGS, ACCIDENTAL (UNINTENTIONAL), INITIAL ENCOUNTER: ICD-10-CM

## 2021-06-18 DIAGNOSIS — Z87.19 PERSONAL HISTORY OF OTHER DISEASES OF THE DIGESTIVE SYSTEM: ICD-10-CM

## 2021-06-18 DIAGNOSIS — T43.221A POISONING BY SELECTIVE SEROTONIN REUPTAKE INHIBITORS, ACCIDENTAL (UNINTENTIONAL), INITIAL ENCOUNTER: ICD-10-CM

## 2021-06-18 DIAGNOSIS — B20 HUMAN IMMUNODEFICIENCY VIRUS [HIV] DISEASE: ICD-10-CM

## 2021-06-18 DIAGNOSIS — Z88.0 ALLERGY STATUS TO PENICILLIN: ICD-10-CM

## 2021-06-18 DIAGNOSIS — Z98.49 CATARACT EXTRACTION STATUS, UNSPECIFIED EYE: Chronic | ICD-10-CM
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
## 2021-06-18 DIAGNOSIS — Z98.49 CATARACT EXTRACTION STATUS, UNSPECIFIED EYE: ICD-10-CM

## 2021-06-18 DIAGNOSIS — T46.4X1A POISONING BY ANGIOTENSIN-CONVERTING-ENZYME INHIBITORS, ACCIDENTAL (UNINTENTIONAL), INITIAL ENCOUNTER: ICD-10-CM

## 2021-06-18 DIAGNOSIS — R53.1 WEAKNESS: ICD-10-CM

## 2021-06-18 DIAGNOSIS — F32.9 MAJOR DEPRESSIVE DISORDER, SINGLE EPISODE, UNSPECIFIED: ICD-10-CM

## 2021-06-18 DIAGNOSIS — Z87.891 PERSONAL HISTORY OF NICOTINE DEPENDENCE: ICD-10-CM

## 2021-06-18 DIAGNOSIS — F41.9 ANXIETY DISORDER, UNSPECIFIED: ICD-10-CM

## 2021-06-18 DIAGNOSIS — Z90.89 ACQUIRED ABSENCE OF OTHER ORGANS: ICD-10-CM

## 2021-06-18 LAB
ALBUMIN SERPL ELPH-MCNC: 4.1 G/DL — SIGNIFICANT CHANGE UP (ref 3.4–5)
ALP SERPL-CCNC: 60 U/L — SIGNIFICANT CHANGE UP (ref 40–120)
ALT FLD-CCNC: 30 U/L — SIGNIFICANT CHANGE UP (ref 12–42)
ANION GAP SERPL CALC-SCNC: 4 MMOL/L — LOW (ref 9–16)
AST SERPL-CCNC: 31 U/L — SIGNIFICANT CHANGE UP (ref 15–37)
BASOPHILS # BLD AUTO: 0.06 K/UL — SIGNIFICANT CHANGE UP (ref 0–0.2)
BASOPHILS NFR BLD AUTO: 1.1 % — SIGNIFICANT CHANGE UP (ref 0–2)
BILIRUB SERPL-MCNC: 0.3 MG/DL — SIGNIFICANT CHANGE UP (ref 0.2–1.2)
BUN SERPL-MCNC: 31 MG/DL — HIGH (ref 7–23)
CALCIUM SERPL-MCNC: 9.5 MG/DL — SIGNIFICANT CHANGE UP (ref 8.5–10.5)
CHLORIDE SERPL-SCNC: 106 MMOL/L — SIGNIFICANT CHANGE UP (ref 96–108)
CO2 SERPL-SCNC: 31 MMOL/L — SIGNIFICANT CHANGE UP (ref 22–31)
CREAT SERPL-MCNC: 1.21 MG/DL — SIGNIFICANT CHANGE UP (ref 0.5–1.3)
EOSINOPHIL # BLD AUTO: 0.09 K/UL — SIGNIFICANT CHANGE UP (ref 0–0.5)
EOSINOPHIL NFR BLD AUTO: 1.6 % — SIGNIFICANT CHANGE UP (ref 0–6)
GLUCOSE SERPL-MCNC: 97 MG/DL — SIGNIFICANT CHANGE UP (ref 70–99)
HCT VFR BLD CALC: 40.2 % — SIGNIFICANT CHANGE UP (ref 39–50)
HGB BLD-MCNC: 13.9 G/DL — SIGNIFICANT CHANGE UP (ref 13–17)
IMM GRANULOCYTES NFR BLD AUTO: 0.4 % — SIGNIFICANT CHANGE UP (ref 0–1.5)
LIDOCAIN IGE QN: 113 U/L — SIGNIFICANT CHANGE UP (ref 73–393)
LYMPHOCYTES # BLD AUTO: 1.42 K/UL — SIGNIFICANT CHANGE UP (ref 1–3.3)
LYMPHOCYTES # BLD AUTO: 25.2 % — SIGNIFICANT CHANGE UP (ref 13–44)
MCHC RBC-ENTMCNC: 31.2 PG — SIGNIFICANT CHANGE UP (ref 27–34)
MCHC RBC-ENTMCNC: 34.6 GM/DL — SIGNIFICANT CHANGE UP (ref 32–36)
MCV RBC AUTO: 90.3 FL — SIGNIFICANT CHANGE UP (ref 80–100)
MONOCYTES # BLD AUTO: 0.44 K/UL — SIGNIFICANT CHANGE UP (ref 0–0.9)
MONOCYTES NFR BLD AUTO: 7.8 % — SIGNIFICANT CHANGE UP (ref 2–14)
NEUTROPHILS # BLD AUTO: 3.61 K/UL — SIGNIFICANT CHANGE UP (ref 1.8–7.4)
NEUTROPHILS NFR BLD AUTO: 63.9 % — SIGNIFICANT CHANGE UP (ref 43–77)
NRBC # BLD: 0 /100 WBCS — SIGNIFICANT CHANGE UP (ref 0–0)
PLATELET # BLD AUTO: 159 K/UL — SIGNIFICANT CHANGE UP (ref 150–400)
POTASSIUM SERPL-MCNC: 4.2 MMOL/L — SIGNIFICANT CHANGE UP (ref 3.5–5.3)
POTASSIUM SERPL-SCNC: 4.2 MMOL/L — SIGNIFICANT CHANGE UP (ref 3.5–5.3)
PROT SERPL-MCNC: 7.3 G/DL — SIGNIFICANT CHANGE UP (ref 6.4–8.2)
RBC # BLD: 4.45 M/UL — SIGNIFICANT CHANGE UP (ref 4.2–5.8)
RBC # FLD: 12.7 % — SIGNIFICANT CHANGE UP (ref 10.3–14.5)
SODIUM SERPL-SCNC: 141 MMOL/L — SIGNIFICANT CHANGE UP (ref 132–145)
WBC # BLD: 5.64 K/UL — SIGNIFICANT CHANGE UP (ref 3.8–10.5)
WBC # FLD AUTO: 5.64 K/UL — SIGNIFICANT CHANGE UP (ref 3.8–10.5)

## 2021-06-18 PROCEDURE — 99284 EMERGENCY DEPT VISIT MOD MDM: CPT

## 2021-06-18 PROCEDURE — 93010 ELECTROCARDIOGRAM REPORT: CPT

## 2021-06-18 RX ORDER — SERTRALINE 25 MG/1
0 TABLET, FILM COATED ORAL
Qty: 0 | Refills: 0 | DISCHARGE

## 2021-06-18 RX ORDER — MARAVIROC 300 MG/1
0 TABLET, FILM COATED ORAL
Qty: 0 | Refills: 0 | DISCHARGE

## 2021-06-18 RX ORDER — AZITHROMYCIN 500 MG/1
0 TABLET, FILM COATED ORAL
Qty: 0 | Refills: 0 | DISCHARGE

## 2021-06-18 RX ORDER — RABEPRAZOLE 20 MG/1
0 TABLET, DELAYED RELEASE ORAL
Qty: 0 | Refills: 0 | DISCHARGE

## 2021-06-18 RX ORDER — ABACAVIR 20 MG/ML
1 SOLUTION ORAL
Qty: 0 | Refills: 0 | DISCHARGE

## 2021-06-18 RX ORDER — ELVITEGRAVIR, COBICISTAT, EMTRICITABINE, AND TENOFOVIR ALAFENAMIDE 150; 150; 200; 10 MG/1; MG/1; MG/1; MG/1
0 TABLET ORAL
Qty: 0 | Refills: 0 | DISCHARGE

## 2021-06-18 RX ORDER — DARUNAVIR ETHANOLATE AND COBICISTAT 800; 150 MG/1; MG/1
1 TABLET, FILM COATED ORAL
Qty: 0 | Refills: 0 | DISCHARGE

## 2021-06-18 RX ORDER — CLONAZEPAM 1 MG
0 TABLET ORAL
Qty: 0 | Refills: 0 | DISCHARGE

## 2021-06-18 NOTE — ED PROVIDER NOTE - PATIENT PORTAL LINK FT
You can access the FollowMyHealth Patient Portal offered by [facility] by registering at the following website: [email or web address]. By joining [employer]’s FollowMyHealth portal, you will also be able to view your health information using other applications (apps) compatible with our system.

## 2021-06-18 NOTE — ED PROVIDER NOTE - NSFOLLOWUPINSTRUCTIONS_ED_ALL_ED_FT
-PLEASE FOLLOW-UP WITH YOUR PRIMARY CARE DOCTOR IN 1-2 DAYS.  BRING ALL PAPERWORK FROM TODAY'S VISIT TO YOUR FOLLOW-UP VISIT.      -RESTART YOUR MEDICATIONS AS NORMAL TOMORROW.     -PLEASE RETURN TO THE ER IMMEDIATELY OR CALL 911 FOR ANY HIGH FEVER, TROUBLE BREATHING, VOMITING, SEVERE PAIN, OR ANY OTHER CONCERNS.

## 2021-06-18 NOTE — ED PROVIDER NOTE - PROGRESS NOTE DETAILS
Pt feeling well and requesting discharge.  Discussed labs and indicated elevated BUN - will f/u with his PCP.  Denies any black stool or bloody stools.

## 2021-06-18 NOTE — ED PROVIDER NOTE - OBJECTIVE STATEMENT
Pt is a 64yo M with a h/o HIV (dx in 1984, recovered from AIDS, VL undetectable and on triple therapy), anxiety, and depression.  pt's partner is currently suffering from metastatic pancreatic cancer and pt is his caregiver.  Today at ~1230pm he was setting up his partner's medications and became flustered and accidentally took his medications instead of his own.  Denies any SI/HI or intent to harm himself.  He called his doctor to discuss the mishap and reports he didn't call him back until ~5 hours later.  He reports feeling generally weak and tired since but improving after eating and taking a nap.  Now feeling otherwise well and denies any HA, CP, SOB, palpitations, N/V/D, or other concerns.     He took Biktarvy, Rosuvastatin 10mg, Lisinopril-HCTZ 10/12.5mg, Metformin 1000mg, and Lexapro 10mg.      He did not take his daily medications, which include Tivicay 50mg, Abacavir 600mg, Prezcobix 150mg, and Sertraline 100mg.

## 2021-06-18 NOTE — ED ADULT NURSE REASSESSMENT NOTE - NS ED NURSE REASSESS COMMENT FT1
Received Pt from previous RN walking back from restroom without issue. Awake and alert breathing without issue on RA and NAD. IV site is patent. [id number] dispo.

## 2021-06-18 NOTE — ED ADULT TRIAGE NOTE - CHIEF COMPLAINT QUOTE
accidentally took partner medications @12 30pm-lisinopril/hctz & metformin & HIV meds- has not been feeling well since ingesting- "feels weak"

## 2021-06-19 PROBLEM — Z21 ASYMPTOMATIC HUMAN IMMUNODEFICIENCY VIRUS [HIV] INFECTION STATUS: Chronic | Status: ACTIVE | Noted: 2017-03-28

## 2021-06-19 PROBLEM — F41.9 ANXIETY DISORDER, UNSPECIFIED: Chronic | Status: ACTIVE | Noted: 2017-06-04

## 2021-06-19 PROBLEM — K21.9 GASTRO-ESOPHAGEAL REFLUX DISEASE WITHOUT ESOPHAGITIS: Chronic | Status: ACTIVE | Noted: 2017-03-28

## 2021-06-19 PROBLEM — F32.9 MAJOR DEPRESSIVE DISORDER, SINGLE EPISODE, UNSPECIFIED: Chronic | Status: ACTIVE | Noted: 2017-06-04

## 2022-07-25 NOTE — ED ADULT TRIAGE NOTE - AS HEIGHT TYPE
stated
Pt presented to the ED for AMS and dizziness CTH showed new focal hypodensity involving the left cerebellar hemisphere as well as the left periatrial white matter consistent with age indeterminate lacunar infarcts PMH of CVA, partial seizures (follows with Dr. Bullock), ADAIR, DM, DLD, BPH, gait disorder (mainly mechanical),infarcts. Per son, patient's memory and cognition have been gradually declining over the last 2 months. He often forgets family members, forgets where he is, and at times talks nonsense. F/u MRI head revealed no evidence of new acute infarct or large intraparenchymal hemorrhage.

## 2024-01-30 NOTE — ED ADULT TRIAGE NOTE - ESI TRIAGE ACUITY LEVEL, MLM
Pt's mother was asked to upload guardianship paperwork into [employer] portal. Mom unaware where in [facility] to upload. Can someone please send mom a msg via [facility] to let her know how to upload, or she is also able to fax those in, but needs fax number to be sent over via [facility].     Please route to appropriate pool, if needed.     Thank you!  Shawna Hamilton, Virtual Visit Facilitator     
Sent Mychart   
4

## 2024-08-08 NOTE — ED ADULT NURSE NOTE - NS ED NURSE RECORD ANOTHER HT AND WT
"Denial was received via Right[employer]ax, stating \"LOV notes not attached\".   Added more details to the original form, printed LOV and faxed all additional info back to fax # of form ([phone])  " No